# Patient Record
Sex: MALE | Race: WHITE | URBAN - METROPOLITAN AREA
[De-identification: names, ages, dates, MRNs, and addresses within clinical notes are randomized per-mention and may not be internally consistent; named-entity substitution may affect disease eponyms.]

---

## 2021-08-03 ENCOUNTER — OFFICE VISIT (OUTPATIENT)
Dept: URGENT CARE | Age: 51
End: 2021-08-03
Payer: COMMERCIAL

## 2021-08-03 VITALS
HEART RATE: 68 BPM | OXYGEN SATURATION: 97 % | HEIGHT: 73 IN | RESPIRATION RATE: 16 BRPM | BODY MASS INDEX: 23.42 KG/M2 | WEIGHT: 176.7 LBS | TEMPERATURE: 98.7 F | SYSTOLIC BLOOD PRESSURE: 178 MMHG | DIASTOLIC BLOOD PRESSURE: 90 MMHG

## 2021-08-03 DIAGNOSIS — R03.0 ELEVATED BLOOD PRESSURE READING: ICD-10-CM

## 2021-08-03 DIAGNOSIS — H92.21 BLOOD IN EAR CANAL, RIGHT: ICD-10-CM

## 2021-08-03 DIAGNOSIS — R42 LIGHTHEADEDNESS: Primary | ICD-10-CM

## 2021-08-03 LAB — GLUCOSE POC: 139 MG/DL

## 2021-08-03 PROCEDURE — 82962 GLUCOSE BLOOD TEST: CPT | Performed by: FAMILY MEDICINE

## 2021-08-03 PROCEDURE — 99203 OFFICE O/P NEW LOW 30 MIN: CPT | Performed by: FAMILY MEDICINE

## 2021-08-03 PROCEDURE — 93010 ELECTROCARDIOGRAM REPORT: CPT

## 2021-08-03 NOTE — PROGRESS NOTES
Yue Castillo is a 46 y.o. male who presents with 20-30 minutes of lightheadedness while leaning head back and forth while wife was cutting his hair 4 days ago around 4:30pm - had not eaten all day at the time. Reports laid down and lightheadedness resolved. Denies SOB, CP, palpitations. Also reports was cleaning right ear with Q-tip this AM and noticed blood at the tip of the Q-tip. Denies ear pain. The history is provided by the patient. History reviewed. No pertinent past medical history. History reviewed. No pertinent surgical history. History reviewed. No pertinent family history. Social History     Socioeconomic History    Marital status:      Spouse name: Not on file    Number of children: Not on file    Years of education: Not on file    Highest education level: Not on file   Occupational History    Not on file   Tobacco Use    Smoking status: Never Smoker    Smokeless tobacco: Current User   Substance and Sexual Activity    Alcohol use: Yes    Drug use: Not on file    Sexual activity: Not on file   Other Topics Concern    Not on file   Social History Narrative    Not on file     Social Determinants of Health     Financial Resource Strain:     Difficulty of Paying Living Expenses:    Food Insecurity:     Worried About Running Out of Food in the Last Year:     [address] in the Last Year:    Transportation Needs:     Lack of Transportation (Medical):      Lack of Transportation (Non-Medical):    Physical Activity:     Days of Exercise per Week:     Minutes of Exercise per Session:    Stress:     Feeling of Stress :    Social Connections:     Frequency of Communication with Friends and Family:     Frequency of Social Gatherings with Friends and Family:     Attends Spiritism Services:     Active Member of Clubs or Organizations:     Attends Club or Organization Meetings:     Marital Status:    Intimate Partner Violence:     Fear of Current or Ex-Partner:     Emotionally Abused:     Physically Abused:     Sexually Abused: ALLERGIES: Patient has no known allergies. Review of Systems   HENT: Positive for congestion. Negative for ear discharge and ear pain. Respiratory: Negative for shortness of breath. Cardiovascular: Negative for chest pain and palpitations. Gastrointestinal: Negative for nausea and vomiting. Neurological: Positive for light-headedness. Vitals:    08/03/21 1101 08/03/21 1127 08/03/21 1130   BP: (!) 174/93 (!) 191/96 (!) 178/90   Pulse: 68     Resp: 16     Temp: 98.7 °F (37.1 °C)     SpO2: 97%     Weight: 176 lb 11.2 oz (80.2 kg)     Height: 6' 1\" (1.854 m)         Physical Exam  Vitals and nursing note reviewed. Constitutional:       General: He is not in acute distress. Appearance: He is well-developed. He is not diaphoretic. HENT:      Right Ear: Tympanic membrane, ear canal and external ear normal. There is no impacted cerumen. Left Ear: Tympanic membrane, ear canal and external ear normal. There is no impacted cerumen. Cardiovascular:      Rate and Rhythm: Normal rate and regular rhythm. Pulmonary:      Effort: Pulmonary effort is normal. No respiratory distress. Breath sounds: Normal breath sounds. No stridor. No wheezing, rhonchi or rales. Neurological:      Mental Status: He is alert. Psychiatric:         Behavior: Behavior normal.         Thought Content: Thought content normal.         Judgment: Judgment normal.         MDM    ICD-10-CM ICD-9-CM   1. Lightheadedness  R42 780.4   2. Blood in ear canal, right  H92.21 388.69   3.  Elevated blood pressure reading  R03.0 796.2       Orders Placed This Encounter    EKG     This order was created via procedure documentation    AMB POC GLUCOSE BLOOD, BY GLUCOSE MONITORING DEVICE    EKG, 12 LEAD, INITIAL     Order Specific Question:   Reason for Exam:     Answer:   lightheadedness     Sx resolved  Ears appear normal  The patient is to follow up with PCP, keep BP log    If signs and symptoms become worse the pt is to go to the ER.          EKG      Date/Time: 8/3/2021 11:27 AM  Performed by: Emmett Rees MD  Authorized by: Emmett Rees MD   Rhythm: sinus rhythm  Rate: normal  BPM: 62  QRS axis: normal  Conduction: conduction normal  ST Segments: ST segments normal  T Waves: T waves normal  Clinical impression: normal ECG        Results for orders placed or performed in visit on 08/03/21   AMB POC GLUCOSE BLOOD, BY GLUCOSE MONITORING DEVICE   Result Value Ref Range    Glucose  MG/DL

## 2021-08-03 NOTE — PATIENT INSTRUCTIONS
Follow up with PCP; take BP log  ER if worse     Lightheadedness or Faintness: Care Instructions  Your Care Instructions  Lightheadedness is a feeling that you are about to faint or \"pass out. \" You do not feel as if you or your surroundings are moving. It is different from vertigo, which is the feeling that you or things around you are spinning or tilting. Lightheadedness usually goes away or gets better when you lie down. If lightheadedness gets worse, it can lead to a fainting spell. It is common to feel lightheaded from time to time. Lightheadedness usually is not caused by a serious problem. It often is caused by a short-lasting drop in blood pressure and blood flow to your head that occurs when you get up too quickly from a seated or lying position. Follow-up care is a key part of your treatment and safety. Be sure to make and go to all appointments, and call your doctor if you are having problems. It's also a good idea to know your test results and keep a list of the medicines you take. How can you care for yourself at home? · Lie down for 1 or 2 minutes when you feel lightheaded. After lying down, sit up slowly and remain sitting for 1 to 2 minutes before slowly standing up. · Avoid movements, positions, or activities that have made you lightheaded in the past.  · Get plenty of rest, especially if you have a cold or flu, which can cause lightheadedness. · Make sure you drink plenty of fluids, especially if you have a fever or have been sweating. · Do not drive or put yourself and others in danger while you feel lightheaded. When should you call for help? Call 911 anytime you think you may need emergency care. For example, call if:    · You have symptoms of a stroke. These may include:  ? Sudden numbness, tingling, weakness, or loss of movement in your face, arm, or leg, especially on only one side of your body. ? Sudden vision changes. ? Sudden trouble speaking.   ? Sudden confusion or trouble understanding simple statements. ? Sudden problems with walking or balance. ? A sudden, severe headache that is different from past headaches.     · You have symptoms of a heart attack. These may include:  ? Chest pain or pressure, or a strange feeling in the chest.  ? Sweating. ? Shortness of breath. ? Nausea or vomiting. ? Pain, pressure, or a strange feeling in the back, neck, jaw, or upper belly or in one or both shoulders or arms. ? Lightheadedness or sudden weakness. ? A fast or irregular heartbeat. After you call 911, the  may tell you to chew 1 adult-strength or 2 to 4 low-dose aspirin. Wait for an ambulance. Do not try to drive yourself. Watch closely for changes in your health, and be sure to contact your doctor if:    · Your lightheadedness gets worse or does not get better with home care. Where can you learn more? Go to http://www.gray.com/  Enter [id number] in the search box to learn more about \"Lightheadedness or Faintness: Care Instructions. \"  Current as of: February 26, 2020               Content Version: 12.8  © 0241-6980 [employer]. Care instructions adapted under license by [employer] (which disclaims liability or warranty for this information). If you have questions about a medical condition or this instruction, always ask your healthcare professional. [place] [address] any warranty or liability for your use of this information. Elevated Blood Pressure: Care Instructions  Your Care Instructions    Blood pressure is a measure of how hard the blood pushes against the walls of your arteries. It's normal for blood pressure to go up and down throughout the day. But if it stays up over time, you have high blood pressure. Two numbers tell you your blood pressure. The first number is the systolic pressure. It shows how hard the blood pushes when your heart is pumping. The second number is the diastolic pressure. It shows how hard the blood pushes between heartbeats, when your heart is relaxed and filling with blood. An ideal blood pressure in adults is less than 120/80 (say \"120 over 80\"). High blood pressure is 140/90 or higher. You have high blood pressure if your top number is 140 or higher or your bottom number is 90 or higher, or both. The main test for high blood pressure is simple, fast, and painless. To diagnose high blood pressure, your doctor will test your blood pressure at different times. After testing your blood pressure, your doctor may ask you to test it again when you are home. If you are diagnosed with high blood pressure, you can work with your doctor to make a long-term plan to manage it. Follow-up care is a key part of your treatment and safety. Be sure to make and go to all appointments, and call your doctor if you are having problems. It's also a good idea to know your test results and keep a list of the medicines you take. How can you care for yourself at home? · Do not smoke. Smoking increases your risk for heart attack and stroke. If you need help quitting, talk to your doctor about stop-smoking programs and medicines. These can increase your chances of quitting for good. · Stay at a healthy weight. · Try to limit how much sodium you eat to less than 2,300 milligrams (mg) a day. Your doctor may ask you to try to eat less than 1,500 mg a day. · Be physically active. Get at least 30 minutes of exercise on most days of the week. Walking is a good choice. You also may want to do other activities, such as running, swimming, cycling, or playing tennis or team sports. · Avoid or limit alcohol. Talk to your doctor about whether you can drink any alcohol. · Eat plenty of fruits, vegetables, and low-fat dairy products. Eat less saturated and total fats. · Learn how to check your blood pressure at home. When should you call for help?   Call your doctor now or seek immediate medical care if:  ? · Your blood pressure is much higher than normal (such as 180/110 or higher). ? · You think high blood pressure is causing symptoms such as:  ¨ Severe headache. ¨ Blurry vision. ? Watch closely for changes in your health, and be sure to contact your doctor if:  ? · You do not get better as expected. Where can you learn more? Go to http://www.gray.com/. Enter V423 in the search box to learn more about \"Elevated Blood Pressure: Care Instructions. \"  Current as of: September 21, 2016  Content Version: 11.4  © 1801-1213 [employer]. Care instructions adapted under license by [employer] (which disclaims liability or warranty for this information). If you have questions about a medical condition or this instruction, always ask your healthcare professional. [address] any warranty or liability for your use of this information.

## 2021-09-01 ENCOUNTER — CLINICAL SUPPORT (OUTPATIENT)
Dept: CARDIOLOGY CLINIC | Age: 51
End: 2021-09-01
Payer: COMMERCIAL

## 2021-09-01 ENCOUNTER — OFFICE VISIT (OUTPATIENT)
Dept: CARDIOLOGY CLINIC | Age: 51
End: 2021-09-01

## 2021-09-01 VITALS
BODY MASS INDEX: 24.11 KG/M2 | RESPIRATION RATE: 18 BRPM | HEART RATE: 74 BPM | WEIGHT: 181.9 LBS | OXYGEN SATURATION: 98 % | SYSTOLIC BLOOD PRESSURE: 130 MMHG | HEIGHT: 73 IN | DIASTOLIC BLOOD PRESSURE: 80 MMHG

## 2021-09-01 DIAGNOSIS — R42 DIZZINESS: ICD-10-CM

## 2021-09-01 DIAGNOSIS — R55 PRE-SYNCOPE: Primary | ICD-10-CM

## 2021-09-01 DIAGNOSIS — Z72.0 TOBACCO ABUSE: ICD-10-CM

## 2021-09-01 DIAGNOSIS — F10.11 HISTORY OF ALCOHOL ABUSE: ICD-10-CM

## 2021-09-01 DIAGNOSIS — R55 PRE-SYNCOPE: ICD-10-CM

## 2021-09-01 DIAGNOSIS — I10 ESSENTIAL HYPERTENSION: Primary | ICD-10-CM

## 2021-09-01 PROCEDURE — 93270 REMOTE 30 DAY ECG REV/REPORT: CPT | Performed by: INTERNAL MEDICINE

## 2021-09-01 PROCEDURE — 93272 ECG/REVIEW INTERPRET ONLY: CPT | Performed by: INTERNAL MEDICINE

## 2021-09-01 PROCEDURE — 93000 ELECTROCARDIOGRAM COMPLETE: CPT | Performed by: INTERNAL MEDICINE

## 2021-09-01 PROCEDURE — 99204 OFFICE O/P NEW MOD 45 MIN: CPT | Performed by: INTERNAL MEDICINE

## 2021-09-01 RX ORDER — HYDROCHLOROTHIAZIDE 12.5 MG/1
12.5 TABLET ORAL DAILY
COMMUNITY

## 2021-09-01 NOTE — PROGRESS NOTES
[address] [place] [address] [address] [facility]  [phone]     Subjective:      Jessica Hunter is a 46 y.o. male is here for new pt consultation. Newly diagnosed with HTN. Presyncopal event beginning of august, witnessed by wife. No LOC and just turned pale and diaphoretic. Went to [facility] EMERGENCY [facility] ED with BP as high as 198/96---labs ok cxr no acute finding. Was dc on Hctz. He dips tobacco, drinks 8-10 beers a day. Did not cut back even after event. Family hx CAD (father has stents)         The patient denies chest pain/ shortness of breath, orthopnea, PND, LE edema, palpitations, syncope. There are no problems to display for this patient. None  Past Medical History:   Diagnosis Date    Essential hypertension     Glaucoma     Syncope       History reviewed. No pertinent surgical history. No Known Allergies   Family History   Problem Relation Age of Onset    Coronary Artery Disease Father       Social History     Socioeconomic History    Marital status:      Spouse name: Not on file    Number of children: Not on file    Years of education: Not on file    Highest education level: Not on file   Occupational History    Not on file   Tobacco Use    Smoking status: Never Smoker    Smokeless tobacco: Current User     Types: Snuff   Substance and Sexual Activity    Alcohol use: Yes     Alcohol/week: 20.0 standard drinks     Types: 20 Cans of beer per week     Comment: daily    Drug use: Never    Sexual activity: Not on file   Other Topics Concern    Not on file   Social History Narrative    Not on file     Social Determinants of Health     Financial Resource Strain:     Difficulty of Paying Living Expenses:    Food Insecurity:     Worried About Running Out of Food in the Last Year:     [address] in the Last Year:    Transportation Needs:     Lack of Transportation (Medical):      Lack of Transportation (Non-Medical):    Physical Activity:     Days of Exercise per Week:     Minutes of Exercise per Session:    Stress:     Feeling of Stress :    Social Connections:     Frequency of Communication with Friends and Family:     Frequency of Social Gatherings with Friends and Family:     Attends Yazidism Services:     Active Member of Clubs or Organizations:     Attends Club or Organization Meetings:     Marital Status:    Intimate Partner Violence:     Fear of Current or Ex-Partner:     Emotionally Abused:     Physically Abused:     Sexually Abused:       Current Outpatient Medications   Medication Sig    hydroCHLOROthiazide (HYDRODIURIL) 12.5 mg tablet Take 12.5 mg by mouth daily. No current facility-administered medications for this visit. Review of Symptoms:  11 systems reviewed, negative other than as stated in the HPI    Physical ExamPhysical Exam:    Vitals:    09/01/21 1040 09/01/21 1103 09/01/21 1105   BP: 136/80 (!) 140/90 130/80   Pulse: 65 68 74   Resp: 18     SpO2: 98%     Weight: 181 lb 14.4 oz (82.5 kg)     Height: 6' 1\" (1.854 m)       Body mass index is 24 kg/m². General PE  Gen:  NAD  Mental Status - Alert. General Appearance - Not in acute distress. HEENT:  PERRL, no carotid bruits or JVD  Chest and Lung Exam   Inspection: Accessory muscles - No use of accessory muscles in breathing. Auscultation:   Breath sounds: - Normal.   Cardiovascular   Inspection: Jugular vein - Bilateral - Inspection Normal.   Palpation/Percussion:   Apical Impulse: - Normal.   Auscultation: Rhythm - Regular. Heart Sounds - S1 WNL and S2 WNL. No S3 or S4. Murmurs & Other Heart Sounds: Auscultation of the heart reveals - No Murmurs. Peripheral Vascular   Upper Extremity: Inspection - Bilateral - No Cyanotic nailbeds or Digital clubbing. Lower Extremity:   Palpation: Edema - Bilateral - No edema. Abdomen:   Soft, non-tender, bowel sounds are active.   Neuro: A&O times 3, CN and motor grossly WNL    Labs:   No results found for: CHOL, CHOLX, CHLST, CHOLV, 651614, HDL, HDLP, LDL, LDLC, DLDLP, TGLX, TRIGL, TRIGP, CHHD, CHHDX  No results found for: CPK, CPKX, CPX  No results found for: NA, K, CL, CO2, AGAP, GLU, BUN, CREA, BUCR, GFRAA, GFRNA, CA, TBIL, TBILI, AP, TP, ALB, GLOB, AGRAT, ALT    EKG:  NSR        Assessment:     Assessment:        ICD-10-CM ICD-9-CM    1. Essential hypertension  I10 401.9 AMB POC EKG ROUTINE W/ 12 LEADS, INTER & REP      ECHO ADULT COMPLETE      CARDIAC EVENT MONITOR   2. History of alcohol abuse  F10.11 305.03 ECHO ADULT COMPLETE      CARDIAC EVENT MONITOR   3. Tobacco abuse  Z72.0 305.1 ECHO ADULT COMPLETE      CARDIAC EVENT MONITOR   4. Pre-syncope  R55 780.2 ECHO ADULT COMPLETE      CARDIAC EVENT MONITOR   5. Dizziness  R42 780.4 ECHO ADULT COMPLETE      CARDIAC EVENT MONITOR       Orders Placed This Encounter    AMB POC EKG ROUTINE W/ 12 LEADS, INTER & REP     Order Specific Question:   Reason for Exam:     Answer:   routine    hydroCHLOROthiazide (HYDRODIURIL) 12.5 mg tablet     Sig: Take 12.5 mg by mouth daily. Plan:     1. Pre-syncope, dizziness  Neg for orthostasis  Isolated event last month. Went to [address] [place] [address] 8/4/2021 and found out to have /96  Labs ok/CXR no acute finding  Obtain echo, 1 mos event    2. Essential hypertension  Now controlled with low dose Hctz      3. History of alcohol abuse  Drinks 8-10 beers a day  Recommend 2-4 servings of alcohol a week      4. Tobacco abuse  Dips tobacco  Tobacco cessation discussed      Patient seen and examined by me with nurse practitioner. Scar Neumann personally performed all components of the history, physical, and medical decision making and agree with the assessment and plan with minor modifications as noted. Evaluate with echo and event monitor.   Advised reducing alcohol consumption        Aziza Graff MD

## 2021-09-01 NOTE — PROGRESS NOTES
1. Have you been to the ER, urgent care clinic since your last visit? Hospitalized since your last visit? [facility] EMERGENCY [facility] ER 8-2-2020 syncope. 2. Have you seen or consulted any other health care providers outside of the [address] [place] [address] since your last visit? Include any pap smears or colon screening.  No

## 2021-09-01 NOTE — PROGRESS NOTES
Patient received a 30 day event monitor. Instructions given verbally as well as an instruction sheet. Pt verbalized understanding.     [facility] Event Monitoring

## 2021-09-03 ENCOUNTER — TELEPHONE (OUTPATIENT)
Dept: CARDIOLOGY CLINIC | Age: 51
End: 2021-09-03

## 2021-09-03 NOTE — TELEPHONE ENCOUNTER
Tried to reach pt at [phone]. [facility] pt has a voicemail that has not been set up yet.

## 2021-09-11 NOTE — TELEPHONE ENCOUNTER
Spoke with patient. Verified patient with two patient identifiers. Asking if we need his parents' records for his medical history. Advised we have his chart and all the info we need. Do NOT need his family's records. Patient verbalized understanding. Spontaneous, unlabored and symmetrical

## 2021-09-24 ENCOUNTER — ANCILLARY PROCEDURE (OUTPATIENT)
Dept: CARDIOLOGY CLINIC | Age: 51
End: 2021-09-24
Payer: COMMERCIAL

## 2021-09-24 VITALS
BODY MASS INDEX: 23.99 KG/M2 | WEIGHT: 181 LBS | SYSTOLIC BLOOD PRESSURE: 130 MMHG | DIASTOLIC BLOOD PRESSURE: 80 MMHG | HEIGHT: 73 IN

## 2021-09-24 DIAGNOSIS — F10.11 HISTORY OF ALCOHOL ABUSE: ICD-10-CM

## 2021-09-24 DIAGNOSIS — Z72.0 TOBACCO ABUSE: ICD-10-CM

## 2021-09-24 DIAGNOSIS — R42 DIZZINESS: ICD-10-CM

## 2021-09-24 DIAGNOSIS — R55 PRE-SYNCOPE: ICD-10-CM

## 2021-09-24 DIAGNOSIS — I10 ESSENTIAL HYPERTENSION: ICD-10-CM

## 2021-09-24 PROCEDURE — 93306 TTE W/DOPPLER COMPLETE: CPT | Performed by: INTERNAL MEDICINE

## 2021-09-27 ENCOUNTER — TELEPHONE (OUTPATIENT)
Dept: CARDIOLOGY CLINIC | Age: 51
End: 2021-09-27

## 2021-09-27 LAB
ECHO AO ASC DIAM: 3.17 CM
ECHO AO ROOT DIAM: 3.7 CM
ECHO AV AREA PEAK VELOCITY: 3.4 CM2
ECHO AV AREA/BSA PEAK VELOCITY: 1.7 CM2/M2
ECHO AV PEAK GRADIENT: 4.8 MMHG
ECHO AV PEAK VELOCITY: 109.5 CM/S
ECHO LA AREA 4C: 19.43 CM2
ECHO LA MAJOR AXIS: 3.06 CM
ECHO LA MINOR AXIS: 1.49 CM
ECHO LA VOL 2C: 52.34 ML (ref 18–58)
ECHO LA VOL 4C: 54.71 ML (ref 18–58)
ECHO LA VOL BP: 60.1 ML (ref 18–58)
ECHO LA VOL/BSA BIPLANE: 29.17 ML/M2 (ref 16–28)
ECHO LA VOLUME INDEX A2C: 25.41 ML/M2 (ref 16–28)
ECHO LA VOLUME INDEX A4C: 26.56 ML/M2 (ref 16–28)
ECHO LV E' LATERAL VELOCITY: 17.6 CM/S
ECHO LV E' SEPTAL VELOCITY: 14.83 CM/S
ECHO LV INTERNAL DIMENSION DIASTOLIC: 5.31 CM (ref 4.2–5.9)
ECHO LV INTERNAL DIMENSION SYSTOLIC: 2.84 CM
ECHO LV IVSD: 0.88 CM (ref 0.6–1)
ECHO LV MASS 2D: 176.3 G (ref 88–224)
ECHO LV MASS INDEX 2D: 85.6 G/M2 (ref 49–115)
ECHO LV POSTERIOR WALL DIASTOLIC: 0.93 CM (ref 0.6–1)
ECHO LVOT DIAM: 2.14 CM
ECHO LVOT PEAK GRADIENT: 4.27 MMHG
ECHO LVOT PEAK VELOCITY: 103.3 CM/S
ECHO LVOT SV: 67.8 ML
ECHO LVOT VTI: 18.84 CM
ECHO MV A VELOCITY: 59.46 CM/S
ECHO MV E DECELERATION TIME (DT): 223.37 MS
ECHO MV E VELOCITY: 58.57 CM/S
ECHO MV E/A RATIO: 0.99
ECHO MV E/E' LATERAL: 3.33
ECHO MV E/E' RATIO (AVERAGED): 3.64
ECHO MV E/E' SEPTAL: 3.95
ECHO RA AREA 4C: 22.09 CM2
ECHO RV TAPSE: 2.54 CM (ref 1.5–2)
LA VOL DISK BP: 55.68 ML (ref 18–58)
LVOT MG: 2.1 MMHG

## 2023-05-11 RX ORDER — HYDROCHLOROTHIAZIDE 12.5 MG/1
12.5 TABLET ORAL DAILY
COMMUNITY